# Patient Record
Sex: FEMALE | Race: WHITE | NOT HISPANIC OR LATINO | Employment: STUDENT | URBAN - METROPOLITAN AREA
[De-identification: names, ages, dates, MRNs, and addresses within clinical notes are randomized per-mention and may not be internally consistent; named-entity substitution may affect disease eponyms.]

---
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
## 2023-02-05 ENCOUNTER — OFFICE VISIT (OUTPATIENT)
Dept: URGENT CARE | Facility: CLINIC | Age: 23
End: 2023-02-05

## 2023-02-05 VITALS
OXYGEN SATURATION: 100 % | SYSTOLIC BLOOD PRESSURE: 120 MMHG | WEIGHT: 135 LBS | BODY MASS INDEX: 20.46 KG/M2 | DIASTOLIC BLOOD PRESSURE: 65 MMHG | HEIGHT: 68 IN | TEMPERATURE: 96.7 F | RESPIRATION RATE: 18 BRPM | HEART RATE: 82 BPM

## 2023-02-05 DIAGNOSIS — K20.90 ESOPHAGITIS: Primary | ICD-10-CM

## 2023-02-05 LAB
ATRIAL RATE: 72 BPM
P AXIS: 60 DEGREES
PR INTERVAL: 126 MS
QRS AXIS: 55 DEGREES
QRSD INTERVAL: 102 MS
QT INTERVAL: 398 MS
QTC INTERVAL: 435 MS
T WAVE AXIS: 36 DEGREES
VENTRICULAR RATE: 72 BPM

## 2023-02-05 RX ORDER — METRONIDAZOLE 500 MG/1
500 TABLET ORAL EVERY 8 HOURS SCHEDULED
COMMUNITY

## 2023-02-05 RX ORDER — DIMENHYDRINATE 50 MG/1
50 TABLET ORAL 2 TIMES DAILY PRN
COMMUNITY

## 2023-02-05 RX ORDER — CEFPODOXIME PROXETIL 200 MG/1
200 TABLET, FILM COATED ORAL 2 TIMES DAILY
COMMUNITY

## 2023-02-05 NOTE — PROGRESS NOTES
[id number]        NAME: Kenia Her is a 23 y o  female  : 2000    MRN: [id number]  DATE: 2023  TIME: 3:35 PM    Assessment and Plan   Esophagitis [K20 90]  1  Esophagitis            EKG NSR at 84bpm  No ST elevation or ischemia  Patient Instructions     Drink plenty of fluids when you take your antibiotics  Do not lay down for 30 minutes after taking them  You can take over the counter pepcid to help reduce your symptoms  Do not take anymore dramamine  Follow up with PCP in 3-5 days  Proceed to  ER if symptoms worsen  Chief Complaint     Chief Complaint   Patient presents with   • Chest Pain     Patient was was discharged from hospital on 2/3/23  Taking Vantin and Flagyl for lymph node/ groin infection  Started having nausia and upset stomach so she was prescribed Dramamine  She took 2 yesterday AM and 2 in the evening and then started having chest pressure  History of Present Illness       Patient is a 89QDL presenting with chest tightness and pressure since last night  She is currently taking Vantin and flagyl for swelling in her lymph node  She was hospitalized at [facility] for it on 2/3/2023  She developed nausea when taking the antibiotics and was told to take dramamine  She tried taking zofran but would only get temporary relief  She denies any cough, congestion, fever or chills  She reports the pain in her groin has subsided  She does admit that she might not be drinking enough fluids with the antibiotics and has been taking them later at night and then laying down shortly afterwards  Chest Pain   Associated symptoms include nausea  Pertinent negatives include no abdominal pain, dizziness, fever, headaches, palpitations, shortness of breath or vomiting  Review of Systems   Review of Systems   Constitutional: Negative for activity change, appetite change, chills and fever  HENT: Negative for congestion      Respiratory: Negative for chest tightness, shortness of breath and wheezing  Cardiovascular: Positive for chest pain  Negative for palpitations  Gastrointestinal: Positive for nausea  Negative for abdominal pain, diarrhea and vomiting  Musculoskeletal: Negative for arthralgias  Neurological: Negative for dizziness and headaches  All other systems reviewed and are negative  Current Medications       Current Outpatient Medications:   •  cefpodoxime (VANTIN) 200 mg tablet, Take 200 mg by mouth 2 (two) times a day, Disp: , Rfl:   •  dimenhyDRINATE (DRAMAMINE) 50 mg tablet, Take 50 mg by mouth 2 (two) times a day as needed for nausea, Disp: , Rfl:   •  ibuprofen (MOTRIN) 600 mg tablet, Take 1 tablet (600 mg total) by mouth every 8 (eight) hours as needed for mild pain for up to 14 days, Disp: 30 tablet, Rfl: 0  •  metroNIDAZOLE (FLAGYL) 500 mg tablet, Take 500 mg by mouth every 8 (eight) hours, Disp: , Rfl:   •  Tri-Lo-Belkis 0 18/0 215/0 25 MG-25 MCG per tablet, , Disp: , Rfl:   •  cetirizine (ZyrTEC) 10 mg tablet, Take 1 tablet (10 mg total) by mouth daily for 14 days, Disp: 14 tablet, Rfl: 0  •  fluticasone (VERAMYST) 27 5 MCG/SPRAY nasal spray, 2 sprays into each nostril daily (Patient not taking: Reported on 2/5/2023), Disp: 1 g, Rfl: 0    Current Allergies     Allergies as of 02/05/2023   • (No Known Allergies)            The following portions of the patient's history were reviewed and updated as appropriate: allergies, current medications, past family history, past medical history, past social history, past surgical history and problem list      Past Medical History:   Diagnosis Date   • Known health problems: none        Past Surgical History:   Procedure Laterality Date   • APPENDECTOMY     • TONSILLECTOMY     • WISDOM TOOTH EXTRACTION         Family History   Problem Relation Age of Onset   • No Known Problems Mother    • No Known Problems Father          Medications have been verified          Objective   /65 Pulse 82   Temp (!) 96 7 °F (35 9 °C)   Resp 18   Ht 5' 8" (1 727 m)   Wt 61 2 kg (135 lb)   LMP 01/22/2023   SpO2 100%   BMI 20 53 kg/m²        Physical Exam     Physical Exam  Vitals and nursing note reviewed  Constitutional:       General: She is not in acute distress  Appearance: She is well-developed and normal weight  She is not ill-appearing or toxic-appearing  Cardiovascular:      Rate and Rhythm: Normal rate and regular rhythm  Heart sounds: Normal heart sounds  Pulmonary:      Effort: Pulmonary effort is normal       Breath sounds: Normal breath sounds  Skin:     General: Skin is warm and dry  Capillary Refill: Capillary refill takes less than 2 seconds  Neurological:      General: No focal deficit present  Mental Status: She is alert

## 2023-02-05 NOTE — PATIENT INSTRUCTIONS
Drink plenty of fluids when you take your antibiotics  Do not lay down for 30 minutes after taking them  You can take over the counter pepcid to help reduce your symptoms    Do not take anymore dramamine

## 2023-07-13 ENCOUNTER — OFFICE VISIT (OUTPATIENT)
Dept: URGENT CARE | Facility: CLINIC | Age: 23
End: 2023-07-13
Payer: COMMERCIAL

## 2023-07-13 DIAGNOSIS — A09 TRAVELER'S DIARRHEA: Primary | ICD-10-CM

## 2023-07-13 PROCEDURE — G0382 LEV 3 HOSP TYPE B ED VISIT: HCPCS | Performed by: PHYSICIAN ASSISTANT
